# Patient Record
Sex: MALE | Race: WHITE | NOT HISPANIC OR LATINO | Employment: FULL TIME | URBAN - NONMETROPOLITAN AREA
[De-identification: names, ages, dates, MRNs, and addresses within clinical notes are randomized per-mention and may not be internally consistent; named-entity substitution may affect disease eponyms.]

---

## 2019-06-19 ENCOUNTER — OFFICE VISIT (OUTPATIENT)
Dept: CARDIOLOGY | Facility: CLINIC | Age: 59
End: 2019-06-19

## 2019-06-19 VITALS
SYSTOLIC BLOOD PRESSURE: 120 MMHG | BODY MASS INDEX: 27.05 KG/M2 | DIASTOLIC BLOOD PRESSURE: 82 MMHG | HEIGHT: 71 IN | OXYGEN SATURATION: 98 % | WEIGHT: 193.2 LBS | HEART RATE: 75 BPM

## 2019-06-19 DIAGNOSIS — E66.3 OVERWEIGHT (BMI 25.0-29.9): ICD-10-CM

## 2019-06-19 DIAGNOSIS — R94.31 ABNORMAL EKG: Primary | ICD-10-CM

## 2019-06-19 DIAGNOSIS — R06.09 DYSPNEA ON EXERTION: ICD-10-CM

## 2019-06-19 DIAGNOSIS — I44.0 FIRST DEGREE AV BLOCK: Primary | ICD-10-CM

## 2019-06-19 DIAGNOSIS — F17.200 SMOKER: ICD-10-CM

## 2019-06-19 PROBLEM — E11.9 TYPE 2 DIABETES MELLITUS WITHOUT COMPLICATION, WITHOUT LONG-TERM CURRENT USE OF INSULIN (HCC): Status: ACTIVE | Noted: 2019-06-19

## 2019-06-19 PROBLEM — I10 ESSENTIAL HYPERTENSION: Status: ACTIVE | Noted: 2019-06-19

## 2019-06-19 PROBLEM — E78.2 MIXED HYPERLIPIDEMIA: Status: ACTIVE | Noted: 2019-06-19

## 2019-06-19 PROCEDURE — 99204 OFFICE O/P NEW MOD 45 MIN: CPT | Performed by: INTERNAL MEDICINE

## 2019-06-19 PROCEDURE — 99406 BEHAV CHNG SMOKING 3-10 MIN: CPT | Performed by: INTERNAL MEDICINE

## 2019-06-19 PROCEDURE — 93000 ELECTROCARDIOGRAM COMPLETE: CPT | Performed by: INTERNAL MEDICINE

## 2019-06-19 RX ORDER — ATORVASTATIN CALCIUM 20 MG/1
TABLET, FILM COATED ORAL DAILY
COMMUNITY

## 2019-06-19 RX ORDER — LISINOPRIL 5 MG/1
TABLET ORAL
COMMUNITY

## 2019-06-19 NOTE — PATIENT INSTRUCTIONS
Steps to Quit Smoking  Smoking tobacco can be harmful to your health and can affect almost every organ in your body. Smoking puts you, and those around you, at risk for developing many serious chronic diseases. Quitting smoking is difficult, but it is one of the best things that you can do for your health. It is never too late to quit.  What are the benefits of quitting smoking?  When you quit smoking, you lower your risk of developing serious diseases and conditions, such as:  · Lung cancer or lung disease, such as COPD.  · Heart disease.  · Stroke.  · Heart attack.  · Infertility.  · Osteoporosis and bone fractures.    Additionally, symptoms such as coughing, wheezing, and shortness of breath may get better when you quit. You may also find that you get sick less often because your body is stronger at fighting off colds and infections. If you are pregnant, quitting smoking can help to reduce your chances of having a baby of low birth weight.  How do I get ready to quit?  When you decide to quit smoking, create a plan to make sure that you are successful. Before you quit:  · Pick a date to quit. Set a date within the next two weeks to give you time to prepare.  · Write down the reasons why you are quitting. Keep this list in places where you will see it often, such as on your bathroom mirror or in your car or wallet.  · Identify the people, places, things, and activities that make you want to smoke (triggers) and avoid them. Make sure to take these actions:  ? Throw away all cigarettes at home, at work, and in your car.  ? Throw away smoking accessories, such as ashtrays and lighters.  ? Clean your car and make sure to empty the ashtray.  ? Clean your home, including curtains and carpets.  · Tell your family, friends, and coworkers that you are quitting. Support from your loved ones can make quitting easier.  · Talk with your health care provider about your options for quitting smoking.  · Find out what treatment  options are covered by your health insurance.    What strategies can I use to quit smoking?  Talk with your healthcare provider about different strategies to quit smoking. Some strategies include:  · Quitting smoking altogether instead of gradually lessening how much you smoke over a period of time. Research shows that quitting “cold turkey” is more successful than gradually quitting.  · Attending in-person counseling to help you build problem-solving skills. You are more likely to have success in quitting if you attend several counseling sessions. Even short sessions of 10 minutes can be effective.  · Finding resources and support systems that can help you to quit smoking and remain smoke-free after you quit. These resources are most helpful when you use them often. They can include:  ? Online chats with a counselor.  ? Telephone quitlines.  ? Printed self-help materials.  ? Support groups or group counseling.  ? Text messaging programs.  ? Mobile phone applications.  · Taking medicines to help you quit smoking. (If you are pregnant or breastfeeding, talk with your health care provider first.) Some medicines contain nicotine and some do not. Both types of medicines help with cravings, but the medicines that include nicotine help to relieve withdrawal symptoms. Your health care provider may recommend:  ? Nicotine patches, gum, or lozenges.  ? Nicotine inhalers or sprays.  ? Non-nicotine medicine that is taken by mouth.    Talk with your health care provider about combining strategies, such as taking medicines while you are also receiving in-person counseling. Using these two strategies together makes you more likely to succeed in quitting than if you used either strategy on its own.  If you are pregnant or breastfeeding, talk with your health care provider about finding counseling or other support strategies to quit smoking. Do not take medicine to help you quit smoking unless told to do so by your health care  provider.  What things can I do to make it easier to quit?  Quitting smoking might feel overwhelming at first, but there is a lot that you can do to make it easier. Take these important actions:  · Reach out to your family and friends and ask that they support and encourage you during this time. Call telephone quitlines, reach out to support groups, or work with a counselor for support.  · Ask people who smoke to avoid smoking around you.  · Avoid places that trigger you to smoke, such as bars, parties, or smoke-break areas at work.  · Spend time around people who do not smoke.  · Lessen stress in your life, because stress can be a smoking trigger for some people. To lessen stress, try:  ? Exercising regularly.  ? Deep-breathing exercises.  ? Yoga.  ? Meditating.  ? Performing a body scan. This involves closing your eyes, scanning your body from head to toe, and noticing which parts of your body are particularly tense. Purposefully relax the muscles in those areas.  · Download or purchase mobile phone or tablet apps (applications) that can help you stick to your quit plan by providing reminders, tips, and encouragement. There are many free apps, such as QuitGuide from the CDC (Centers for Disease Control and Prevention). You can find other support for quitting smoking (smoking cessation) through smokefree.gov and other websites.    How will I feel when I quit smoking?  Within the first 24 hours of quitting smoking, you may start to feel some withdrawal symptoms. These symptoms are usually most noticeable 2-3 days after quitting, but they usually do not last beyond 2-3 weeks. Changes or symptoms that you might experience include:  · Mood swings.  · Restlessness, anxiety, or irritation.  · Difficulty concentrating.  · Dizziness.  · Strong cravings for sugary foods in addition to nicotine.  · Mild weight gain.  · Constipation.  · Nausea.  · Coughing or a sore throat.  · Changes in how your medicines work in your  "body.  · A depressed mood.  · Difficulty sleeping (insomnia).    After the first 2-3 weeks of quitting, you may start to notice more positive results, such as:  · Improved sense of smell and taste.  · Decreased coughing and sore throat.  · Slower heart rate.  · Lower blood pressure.  · Clearer skin.  · The ability to breathe more easily.  · Fewer sick days.    Quitting smoking is very challenging for most people. Do not get discouraged if you are not successful the first time. Some people need to make many attempts to quit before they achieve long-term success. Do your best to stick to your quit plan, and talk with your health care provider if you have any questions or concerns.  This information is not intended to replace advice given to you by your health care provider. Make sure you discuss any questions you have with your health care provider.  Document Released: 12/12/2002 Document Revised: 07/24/2018 Document Reviewed: 05/03/2016  [employer] Interactive Patient Education © 2019 [employer]  Exercise Stress Test  An exercise stress test is a test that is done to collect information about how your heart functions during exercise. The test is done while you are walking on a treadmill or using an exercise bike. The goal is to raise your heart rate and \"stress\" the heart. The heart is evaluated before, during, and after you exercise. An electrocardiogram (ECG) will be used to monitor the heart, and your blood pressure will also be monitored. In some cases, nuclear scanning or an ultrasound of the heart (echocardiogram) will also be done to evaluate your heart.  An exercise stress test is done to look for coronary artery disease (CAD). The test may also be done to:  · Evaluate your limits of exercise during cardiac rehabilitation.  · Check for high blood pressure during exercise.  · Check how well you can exercise after such treatments as coronary stenting or new medicines.  · Check for problems with blood flow to " your arms and legs during exercise.    If you have an abnormal test result, this may mean that you are not getting enough blood flow to your heart during exercise. More testing may be needed to understand why your test was not normal.  Tell a health care provider about:  · Any allergies you have.  · All medicines you are taking, including vitamins, herbs, eye drops, creams, and over-the-counter medicines.  · Any blood disorders you have.  · Any surgeries you have had.  · Any medical conditions you have.  · Whether you are pregnant or may be pregnant.  What are the risks?  Generally, this is a safe procedure. However, problems may occur, including:  · Pain or pressure in the following areas:  ? Chest.  ? Jaw or neck.  ? Between your shoulder blades.  ? Down your left arm.  · Dizziness or lightheadedness.  · Shortness of breath.  · Increased or irregular heartbeats.  · Nausea or vomiting.  · Heart attack (rare).  · Life-threatening abnormal heart rhythm (rare).    What happens before the procedure?  · Follow instructions from your health care provider about eating or drinking restrictions.  ? You may be told to avoid all forms of caffeine for 24 hours before the test. This includes coffee, tea (even decaffeinated tea), caffeinated sodas, chocolate, cocoa, and certain pain medicines.  · Ask your health care provider about:  ? Taking over-the-counter medicines, vitamins, herbs, and supplements.  ? Changing or stopping your regular medicines. This is especially important if you are taking diabetes medicines or beta-blocker medicines.  § If you have diabetes, ask how you are to take your insulin or pills. It is common to adjust your insulin dose the morning of the test.  § If you are taking beta-blocker medicines, it is important to talk to your health care provider about these medicines well before the date of your test. Taking beta-blocker medicines may interfere with the test. In some cases, these medicines may need to  be changed or stopped 24 hours or more before the test.  § If you wear a nitroglycerin patch, it may need to be removed prior to the test. Ask your health care provider if the patch should be removed before the test.  · If you use an inhaler for any breathing condition, bring it with you to the test.  · Do not apply lotions, powders, creams, or oils on your chest prior to the test.  · Wear loose-fitting clothes and comfortable walking shoes.  · Do not use any products that contain nicotine or tobacco, such as cigarettes and e-cigarettes, for 4 hours before the test or as told by your health care provider. If you need help quitting, ask your health care provider.  What happens during the procedure?  · Multiple electrodes will be attached to your chest.  · Multiple wires will be attached to the electrodes. These will transfer the electrical impulses from your heart to the ECG machine. Your heart will be monitored both at rest and while exercising.  · If you are also having an echocardiogram or nuclear scanning, images of your heart will be taken before and after you exercise.  · A blood pressure cuff will be placed around your arm to measure your blood pressure throughout the test. You will feel it tighten and loosen throughout the test.  · You will walk on a treadmill or use a stationary bike. If you cannot use these, you may be asked to turn a crank with your hands.  · You will start at a slow pace or level on the exercise machine. The exercise difficulty will be slowly increased to raise your heart rate. In the case of a treadmill, the speed and incline will gradually be increased.  · You may be asked to periodically breathe into a tube. This measures the gases you breathe out.  · You will be asked how you are feeling throughout the test. You will be asked to rate your level of exertion.  · Tell the staff right away if you feel:  ? Chest pain.  ? Dizziness.  ? Shortness of breath.  ? Too fatigued to  continue.  ? Pain or aching in your legs or arms.  · You will exercise until you have symptoms or until you reach a target heart rate. The test will also be stopped if you have changes in your blood pressure or ECG readings, or if you develop an irregular heartbeat (arrhythmia).  The procedure may vary among health care providers and hospitals.  What happens after the procedure?  · You will sit down and recover from the exercise. Your blood pressure, heart rate, and ECG will be monitored until you recover.  · You may return to your normal schedule, including diet, activities, and medicines, unless your health care provider tells you otherwise.  · It is up to you to get your test results. Ask your health care provider, or the department that is doing the test, when your results will be ready.  Summary  · An exercise stress test is a test that is done to collect information about how your heart functions during exercise.  · This test is done to look for coronary artery disease (CAD).  · During this test, you will walk on a treadmill or use an exercise bike to raise your heart rate.  · It is important to follow instructions from your health care provider about eating and drinking restrictions before the test. This may include avoiding caffeine and certain medicines before the test.  This information is not intended to replace advice given to you by your health care provider. Make sure you discuss any questions you have with your health care provider.  Document Released: 12/15/2001 Document Revised: 02/21/2018 Document Reviewed: 02/21/2018  [employer] Interactive Patient Education © 2019 [employer]    Dr. Morris has recommended a Six-Minute Walk Test    What Is the Six-Minute Walk Test?    The American Thoracic Society describes the six-minute walk test as a measure of functional status or fitness. It is used as a simple measure of aerobic exercise capacity. The results of this test may or may not lead your doctor  to do more sophisticated measures of your heart and lung function. During this test, you walk at your normal pace for six minutes. This test can be used to monitor your response to treatments for heart, lung and other health problems. This test is commonly used for people with pulmonary hypertension, interstitial lung disease, pre-lung transplant evaluation or COPD.      What to Expect When Doing the Test      Preparing for your test:  · Wear clothes and shoes that are comfortable.  · You may use your usual walking aids such as a cane or walker, if needed.  · It is okay to eat a light meal prior to your test.  · Take your usual medications.  · Do not exercise within two hours of testing.      During the test:  · The lin will measure your blood pressure, pulse and oxygen level usually with a pulse oximeter before you start to walk.  · You should be given the following instructions: The object of the test is to walk as far as possible for six minutes. You will walk at your normal pace to a chair or cone, and turn around. And you continue to walk back and forth for six minutes.  · Let the staff know if you are having chest pain or breathing difficulty.  · It is acceptable to slow down, rest or stop. After every minute interval, you will be given an update.      Safety:  · The lin will watch to see if you have breathing difficulty or chest pain.  · Oxygen and other supplies will be nearby if you need them.      Understanding the Results  The results of your test are then compared to what is known to be normal for people in your weight, height, gender and age categories. They can be used to estimate response to treatment, especially if repeated after a time interval, for instance six months or a year later. After your test, your provider may change your medication or exercise program based on your results.      What Are the Risks?  This is a low-risk medical evaluation. Medical help is easily available while the  test is being done.          This content was developed in partnership with the [employer], the philanthropic arm of the [employer].  Approved by Scientific and Medical Editorial Review Panel. Last reviewed May 31, 2017.

## 2019-06-19 NOTE — PROGRESS NOTES
Cardiovascular Medicine      Jaylon Morris M.D., Ph.D., [facility]         Yary Graham, APRN  [address] [place] [address]    Thank you for asking me to see David Najera for Abnormal EKG.    History of Present Illness  This is a 58 y.o. male with:    1.  Abnormal EKG: First-degree AV block with DE interval-208 ms  2.  Exertional dyspnea  3.  Risk factors: Type 2 diabetes, hypertension, dyslipidemia, smoker    David Najera is a 58 y.o. male who presents for consultation today.  Patient is actually referred for an abnormal EKG.  He was seen by an APRN who he typically sees for primary care.  He was noted to have some exertional dyspnea.  He has numerous risk factors for the development of coronary artery disease.  An EKG was obtained and was abnormal.  It showed a DE interval of 208 ms.  This is the first time he has been told that he had an abnormal EKG.  He denies a history of MI, CHF or coronary artery disease.  He is never had an ischemia evaluation.  He had no resting, exertional or nocturnal angina.  He does have numerous risk factors for the development of coronary artery disease.  Unfortunately, he does smoke.  He has smoked for quite some time and remains pre-contemplative. He smokes about 1 PPD.  He does have uncontrolled type 2 diabetes.  He is also been diagnosed with dyslipidemia and hypertension.  All of his comorbidities are managed by his primary care provider.  He has had no exertional dizziness, lightheadedness or syncope.  He does endorse exercise tolerance and significant dyspnea on exertion.  Interestingly, he has had no pulmonary function tests.  He has had no lower extremity edema, PND or orthopnea.  He has not had a TTE to examine his left ventricular ejection fraction or right ventricular ejection fraction.  PA pressures are unknown.     Review of Systems - Review of Systems   Cardiovascular: Positive for dyspnea on exertion. Negative for chest pain, claudication, cyanosis,  "irregular heartbeat, leg swelling, near-syncope, orthopnea, palpitations, paroxysmal nocturnal dyspnea and syncope.   Respiratory: Positive for cough, shortness of breath and wheezing. Negative for hemoptysis.         All other systems were reviewed and were negative.    family history includes Diabetes in his mother and sister.         No Known Allergies      Current Outpatient Medications:   •  metFORMIN (GLUCOPHAGE) 500 MG tablet, 500 mg Every 12 (Twelve) Hours. 2 tablets two times daily, Disp: , Rfl:   •  ASPIRIN 81 PO, Daily., Disp: , Rfl:   •  atorvastatin (LIPITOR) 20 MG tablet, Daily., Disp: , Rfl:   •  Insulin Glargine (LANTUS SOLOSTAR) 100 UNIT/ML injection pen, Lantus Solostar U-100 Insulin 100 unit/mL (3 mL) subcutaneous pen  Inject 10 units every day by subcutaneous route., Disp: , Rfl:   •  Liraglutide (VICTOZA) 18 MG/3ML solution pen-injector injection, Victoza 2-Justin 0.6 mg/0.1 mL (18 mg/3 mL) subcutaneous pen injector  Inject 0.6 mg daily for 1 week subcutaneously, then increase to 1.2 mg injected subcutaneously daily, Disp: , Rfl:   •  lisinopril (PRINIVIL,ZESTRIL) 5 MG tablet, lisinopril 5 mg tablet  Take 1 tablet(s) every day by oral route in the morning., Disp: , Rfl:         Physical Exam:  Vitals:    06/19/19 0931 06/19/19 0932   BP: 122/84 120/82   BP Location: Left arm Right arm   Patient Position: Sitting Sitting   Cuff Size: Adult Adult   Pulse: 75    SpO2: 98%    Weight: 87.6 kg (193 lb 3.2 oz)    Height: 180.3 cm (71\")    PainSc: 0-No pain    Body mass index is 26.95 kg/m².    Pulse Ox: Normal  on room air  General: alert, appears stated age and cooperative     Body Habitus: overweight    HEENT: Head: Normocephalic, no lesions, without obvious abnormality. No arcus senilis, xanthelasma or xanthomas.    Neuro: alert, oriented x3  speech normal in context and clarity  memory intact grossly  Pulses: 2+ and symmetric  JVP: Volume/Pulsation: Normal.  Normal waveforms.   Appropriate inspiratory " decrease.  No Kussmaul's. No Jaylen's.   Carotid Exam: no bruit normal pulsation bilaterally   Carotid Volume: normal.     Subclavian Bruit: absent  Vertebral Bruit: absent  Respirations: no increased work of breathing   Chest:  Normal    Pulmonary:Normal     Precordium: Normal impulses. P2 is not palpable.  RV Heave: absent  LV Heave: absent  [place]:  normal size and placement  Palpable S4: absent.  Heart rate: normal    Heart Rhythm: regular     Heart Sounds: S1: normal intensity  S2: normal intensity  S3: absent   S4: absent  Opening Snap: absent    A2-OS:  no  Pericardial Rub:  Absent   Ejection click: None      Murmurs:  absent   Extremity: moves all extremities equally.     DATA REVIEWED:     EKG. I personally reviewed and interpreted the EKG.    EKG today: Normal                              Assessment/Plan     1. First degree AV block.  As best I can tell, the patient is actually asymptomatic from this. His EKG is actually normal today.  I did go over with him the pathophysiology and epidemiology of this.  Regular evaluation is important because patients have been shown to have an increased risk of developing atrial fibrillation and higher-degree AV block.  It would be reasonable for his PCP to perform a yearly EKG.  Otherwise, reassurance was provided.    2. Dyspnea on exertion, likely multifactorial.  He does have a significant smoking history and I am concerned about the development of COPD.  He also has numerous risk factors for the development of obstructive coronary artery disease including uncontrolled type 2 diabetes.  Dyspnea might represent an anginal equivalent in this gentleman.  EKG is abnormal with a first-degree AV block, but is otherwise interpretable.    I have also recommended a 2D TTE to exclude structural heart disease and to evaluate his PA pressures.  He also will need a chest x-ray and PFT/pulmonary referral.  -TST  - Adult Transthoracic Echo Complete W/ Cont if Necessary Per  "Protocol  -PFTs, 6-minute walk test    3. Cardiac Risk Assessment/Dyslipidemia/BP Goals/Glucose Status/Diet/Current Weight/Tobacco status/Screening:   -Continue follow-up visits with PCP for monitoring of labs; Dietary changes: Increase soluble fiber: A printed copy of a low fat, low cholesterol diet was given; Reduce saturated fat, \"trans\" monounsaturated fatty acids, and cholesterol; Exercise changes:  Encouraged daily aerobic activity.    Blood Pressure:  -HTN is a significant risk factor for stroke, heart disease and vascular disease. I've recommended the patient continue current medications, if any, as prescribed by the primary care provider. I recommended they have close follow-up for ongoing mgmt of this and the medical comorbidities associated with HTN with their PCP.  -HTN hand-out    General patient education regarding weight:   The patient's BMI is Body mass index is 26.95 kg/m²..  This places the patient in weight class:  Overweight: 25.0-29.9kg/m2 .   -Weight loss hand-out  -Exercise intervention:   Hand out, increase aerobic activity  -Close PCP follow-up for Body mass index is 26.95 kg/m²..       Current Tobacco User:   -I advised David of the risks of continuing to use tobacco, and I provided him with tobacco cessation educational materials in the After Visit Summary.     During this visit, I spent 3 minutes counseling the patient regarding tobacco cessation.    -1-800-QUIT-NOW information was provided.     Screening:  -AAA screening recommended at 65.    Return for 4-6 weeks after testing for results.      "

## 2019-09-17 ENCOUNTER — PROCEDURE VISIT (OUTPATIENT)
Dept: CARDIOLOGY | Facility: CLINIC | Age: 59
End: 2019-09-17

## 2019-09-17 ENCOUNTER — OFFICE VISIT (OUTPATIENT)
Dept: PULMONOLOGY | Facility: CLINIC | Age: 59
End: 2019-09-17

## 2019-09-17 DIAGNOSIS — R06.09 DYSPNEA ON EXERTION: Primary | ICD-10-CM

## 2019-09-17 DIAGNOSIS — R06.09 DYSPNEA ON EXERTION: ICD-10-CM

## 2019-09-17 LAB
BH CV ECHO MEAS - ACS: 1.8 CM
BH CV ECHO MEAS - AO ISTHMUS: 2.2 CM
BH CV ECHO MEAS - AO MAX PG (FULL): 0.83 MMHG
BH CV ECHO MEAS - AO MAX PG: 4.1 MMHG
BH CV ECHO MEAS - AO MEAN PG (FULL): 0 MMHG
BH CV ECHO MEAS - AO MEAN PG: 2 MMHG
BH CV ECHO MEAS - AO ROOT AREA (BSA CORRECTED): 1.5
BH CV ECHO MEAS - AO ROOT AREA: 8 CM^2
BH CV ECHO MEAS - AO ROOT DIAM: 3.2 CM
BH CV ECHO MEAS - AO V2 MAX: 101 CM/SEC
BH CV ECHO MEAS - AO V2 MEAN: 62 CM/SEC
BH CV ECHO MEAS - AO V2 VTI: 15.9 CM
BH CV ECHO MEAS - ASC AORTA: 2.8 CM
BH CV ECHO MEAS - AVA(I,A): 3.6 CM^2
BH CV ECHO MEAS - AVA(I,D): 3.6 CM^2
BH CV ECHO MEAS - AVA(V,A): 3.4 CM^2
BH CV ECHO MEAS - AVA(V,D): 3.4 CM^2
BH CV ECHO MEAS - BSA(HAYCOCK): 2.1 M^2
BH CV ECHO MEAS - BSA: 2.1 M^2
BH CV ECHO MEAS - BZI_BMI: 26.9 KILOGRAMS/M^2
BH CV ECHO MEAS - BZI_METRIC_HEIGHT: 180.3 CM
BH CV ECHO MEAS - BZI_METRIC_WEIGHT: 87.5 KG
BH CV ECHO MEAS - EDV(CUBED): 91.1 ML
BH CV ECHO MEAS - EDV(MOD-SP2): 75 ML
BH CV ECHO MEAS - EDV(MOD-SP4): 85 ML
BH CV ECHO MEAS - EDV(TEICH): 92.4 ML
BH CV ECHO MEAS - EF(CUBED): 82.9 %
BH CV ECHO MEAS - EF(MOD-BP): 74 %
BH CV ECHO MEAS - EF(MOD-SP2): 70.7 %
BH CV ECHO MEAS - EF(MOD-SP4): 77.6 %
BH CV ECHO MEAS - EF(TEICH): 75.9 %
BH CV ECHO MEAS - ESV(CUBED): 15.6 ML
BH CV ECHO MEAS - ESV(MOD-SP2): 22 ML
BH CV ECHO MEAS - ESV(MOD-SP4): 19 ML
BH CV ECHO MEAS - ESV(TEICH): 22.3 ML
BH CV ECHO MEAS - FS: 44.4 %
BH CV ECHO MEAS - IVS/LVPW: 1
BH CV ECHO MEAS - IVSD: 0.9 CM
BH CV ECHO MEAS - LA DIMENSION: 3 CM
BH CV ECHO MEAS - LA/AO: 0.94
BH CV ECHO MEAS - LV DIASTOLIC VOL/BSA (35-75): 40.9 ML/M^2
BH CV ECHO MEAS - LV MASS(C)D: 132.8 GRAMS
BH CV ECHO MEAS - LV MASS(C)DI: 63.9 GRAMS/M^2
BH CV ECHO MEAS - LV MAX PG: 3.3 MMHG
BH CV ECHO MEAS - LV MEAN PG: 2 MMHG
BH CV ECHO MEAS - LV SYSTOLIC VOL/BSA (12-30): 9.1 ML/M^2
BH CV ECHO MEAS - LV V1 MAX: 90.2 CM/SEC
BH CV ECHO MEAS - LV V1 MEAN: 56.8 CM/SEC
BH CV ECHO MEAS - LV V1 VTI: 15.2 CM
BH CV ECHO MEAS - LVIDD: 4.5 CM
BH CV ECHO MEAS - LVIDS: 2.5 CM
BH CV ECHO MEAS - LVLD AP2: 7.5 CM
BH CV ECHO MEAS - LVLD AP4: 7.4 CM
BH CV ECHO MEAS - LVLS AP2: 5.4 CM
BH CV ECHO MEAS - LVLS AP4: 5.6 CM
BH CV ECHO MEAS - LVOT AREA (M): 3.8 CM^2
BH CV ECHO MEAS - LVOT AREA: 3.8 CM^2
BH CV ECHO MEAS - LVOT DIAM: 2.2 CM
BH CV ECHO MEAS - LVPWD: 0.9 CM
BH CV ECHO MEAS - MV A MAX VEL: 93.2 CM/SEC
BH CV ECHO MEAS - MV DEC SLOPE: 498 CM/SEC^2
BH CV ECHO MEAS - MV E MAX VEL: 63.6 CM/SEC
BH CV ECHO MEAS - MV E/A: 0.68
BH CV ECHO MEAS - MV MAX PG: 2.5 MMHG
BH CV ECHO MEAS - MV MEAN PG: 1 MMHG
BH CV ECHO MEAS - MV P1/2T MAX VEL: 73.7 CM/SEC
BH CV ECHO MEAS - MV P1/2T: 43.3 MSEC
BH CV ECHO MEAS - MV V2 MAX: 79.8 CM/SEC
BH CV ECHO MEAS - MV V2 MEAN: 56.1 CM/SEC
BH CV ECHO MEAS - MV V2 VTI: 19.5 CM
BH CV ECHO MEAS - MVA P1/2T LCG: 3 CM^2
BH CV ECHO MEAS - MVA(P1/2T): 5.1 CM^2
BH CV ECHO MEAS - MVA(VTI): 3 CM^2
BH CV ECHO MEAS - PA MAX PG: 7.8 MMHG
BH CV ECHO MEAS - PA V2 MAX: 140 CM/SEC
BH CV ECHO MEAS - RAP SYSTOLE: 8 MMHG
BH CV ECHO MEAS - RVDD: 2.8 CM
BH CV ECHO MEAS - RVSP: 32 MMHG
BH CV ECHO MEAS - SI(AO): 61.6 ML/M^2
BH CV ECHO MEAS - SI(CUBED): 36.3 ML/M^2
BH CV ECHO MEAS - SI(LVOT): 27.8 ML/M^2
BH CV ECHO MEAS - SI(MOD-SP2): 25.5 ML/M^2
BH CV ECHO MEAS - SI(MOD-SP4): 31.8 ML/M^2
BH CV ECHO MEAS - SI(TEICH): 33.8 ML/M^2
BH CV ECHO MEAS - SV(AO): 127.9 ML
BH CV ECHO MEAS - SV(CUBED): 75.5 ML
BH CV ECHO MEAS - SV(LVOT): 57.8 ML
BH CV ECHO MEAS - SV(MOD-SP2): 53 ML
BH CV ECHO MEAS - SV(MOD-SP4): 66 ML
BH CV ECHO MEAS - SV(TEICH): 70.1 ML
BH CV ECHO MEAS - TR MAX VEL: 239 CM/SEC

## 2019-09-17 PROCEDURE — 94729 DIFFUSING CAPACITY: CPT | Performed by: INTERNAL MEDICINE

## 2019-09-17 PROCEDURE — 94060 EVALUATION OF WHEEZING: CPT | Performed by: INTERNAL MEDICINE

## 2019-09-17 PROCEDURE — 94727 GAS DIL/WSHOT DETER LNG VOL: CPT | Performed by: INTERNAL MEDICINE

## 2019-09-17 PROCEDURE — 94618 PULMONARY STRESS TESTING: CPT | Performed by: INTERNAL MEDICINE

## 2019-09-17 NOTE — PROGRESS NOTES
David Najera  Procedure: 6 Minute Walk Test   Indication:avila    Pretest: /64               HR:79               Sa02:99               Dyspnea:0               Fatigue:0    Post Test: BP:118/74               HR:93               Sa02:98               Dyspnea:3               Fatigue:0    First 6MWT:yes    Supplemental oxygen during test:no    Stopped before 6 minutes:no    Pauses:none    Results in distance walked:376.36 m    Did individual experience any pain or discomfort:no    Attestation:  I was immediately available for the above test and agree with the data.     NYHA Functional Class II.    Jaylon Morris MD PhD      -----------------------------------------------------------------------------------------------------------------  [facility] performs 6MWT to the ATS guidelines for 6MWT (2002). Predicted distance is from:      -------------------------------------------------------------------------------------------------------------

## 2019-09-18 ENCOUNTER — DOCUMENTATION (OUTPATIENT)
Dept: CARDIOLOGY | Facility: CLINIC | Age: 59
End: 2019-09-18

## 2019-09-18 DIAGNOSIS — R94.39 ABNORMAL STRESS ECG WITH TREADMILL: Primary | ICD-10-CM

## 2019-09-18 DIAGNOSIS — R06.09 DYSPNEA ON EXERTION: ICD-10-CM

## 2019-09-18 LAB
BH CV STRESS BP STAGE 1: NORMAL
BH CV STRESS BP STAGE 2: NORMAL
BH CV STRESS DURATION MIN STAGE 1: 3
BH CV STRESS DURATION MIN STAGE 2: 2
BH CV STRESS DURATION SEC STAGE 1: 0
BH CV STRESS DURATION SEC STAGE 2: 24
BH CV STRESS GRADE STAGE 1: 10
BH CV STRESS GRADE STAGE 2: 12
BH CV STRESS HR STAGE 1: 122
BH CV STRESS HR STAGE 2: 136
BH CV STRESS METS STAGE 1: 5
BH CV STRESS METS STAGE 2: 7.5
BH CV STRESS PROTOCOL 1: NORMAL
BH CV STRESS RECOVERY BP: NORMAL MMHG
BH CV STRESS RECOVERY HR: 97 BPM
BH CV STRESS SPEED STAGE 1: 1.7
BH CV STRESS SPEED STAGE 2: 2.5
BH CV STRESS STAGE 1: 1
BH CV STRESS STAGE 2: 2
MAXIMAL PREDICTED HEART RATE: 160 BPM
PERCENT MAX PREDICTED HR: 85.63 %
STRESS BASELINE BP: NORMAL MMHG
STRESS BASELINE HR: 93 BPM
STRESS PERCENT HR: 101 %
STRESS POST ESTIMATED WORKLOAD: 7 METS
STRESS POST EXERCISE DUR MIN: 5 MIN
STRESS POST EXERCISE DUR SEC: 24 SEC
STRESS POST PEAK BP: NORMAL MMHG
STRESS POST PEAK HR: 137 BPM
STRESS TARGET HR: 136 BPM

## 2019-09-25 ENCOUNTER — TELEPHONE (OUTPATIENT)
Dept: CARDIOLOGY | Facility: CLINIC | Age: 59
End: 2019-09-25

## 2019-09-25 NOTE — TELEPHONE ENCOUNTER
Patient was instructed to report to [facility] for stress echo on October 15 at 2:45. This was rescheduled from 1 pm per dr. Morris. Mr. Najera verbalized understanding.

## 2019-10-15 ENCOUNTER — HOSPITAL ENCOUNTER (OUTPATIENT)
Dept: CARDIOLOGY | Facility: HOSPITAL | Age: 59
Discharge: HOME | End: 2019-10-15
Admitting: INTERNAL MEDICINE

## 2019-10-15 ENCOUNTER — APPOINTMENT (OUTPATIENT)
Dept: CARDIOLOGY | Facility: HOSPITAL | Age: 59
End: 2019-10-15

## 2019-10-15 VITALS — WEIGHT: 182 LBS | BODY MASS INDEX: 25.38 KG/M2

## 2019-10-15 LAB
BH CV ECHO MEAS - BSA(HAYCOCK): 2.1 M^2
BH CV ECHO MEAS - BSA: 2.1 M^2
BH CV ECHO MEAS - BZI_BMI: 26.9 KILOGRAMS/M^2
BH CV ECHO MEAS - BZI_METRIC_HEIGHT: 180.3 CM
BH CV ECHO MEAS - BZI_METRIC_WEIGHT: 87.5 KG
BH CV STRESS BP STAGE 1: NORMAL
BH CV STRESS BP STAGE 2: NORMAL
BH CV STRESS BP STAGE 3: NORMAL
BH CV STRESS DOSE DOBUTAMINE STAGE 1: 10
BH CV STRESS DOSE DOBUTAMINE STAGE 2: 20
BH CV STRESS DOSE DOBUTAMINE STAGE 3: 30
BH CV STRESS DURATION MIN STAGE 1: 3
BH CV STRESS DURATION MIN STAGE 2: 3
BH CV STRESS DURATION MIN STAGE 3: 2
BH CV STRESS DURATION SEC STAGE 1: 0
BH CV STRESS DURATION SEC STAGE 2: 0
BH CV STRESS DURATION SEC STAGE 3: 26
BH CV STRESS ECHO POST STRESS EJECTION FRACTION EF: 70 %
BH CV STRESS HR STAGE 1: 83
BH CV STRESS HR STAGE 2: 99
BH CV STRESS HR STAGE 3: 139
BH CV STRESS PROTOCOL 1: NORMAL
BH CV STRESS RECOVERY BP: NORMAL MMHG
BH CV STRESS RECOVERY HR: 101 BPM
BH CV STRESS STAGE 1: 1
BH CV STRESS STAGE 2: 2
BH CV STRESS STAGE 3: 3
MAXIMAL PREDICTED HEART RATE: 160 BPM
PERCENT MAX PREDICTED HR: 86.88 %
STRESS BASELINE BP: NORMAL MMHG
STRESS BASELINE HR: 84 BPM
STRESS PERCENT HR: 102 %
STRESS POST ESTIMATED WORKLOAD: 1 METS
STRESS POST EXERCISE DUR MIN: 8 MIN
STRESS POST EXERCISE DUR SEC: 25 SEC
STRESS POST PEAK BP: NORMAL MMHG
STRESS POST PEAK HR: 139 BPM
STRESS TARGET HR: 136 BPM

## 2019-10-15 PROCEDURE — 93017 CV STRESS TEST TRACING ONLY: CPT

## 2019-10-15 PROCEDURE — 93350 STRESS TTE ONLY: CPT

## 2019-10-15 PROCEDURE — 25010000002 DOBUTAMINE: Performed by: INTERNAL MEDICINE

## 2019-10-15 PROCEDURE — 93351 STRESS TTE COMPLETE: CPT | Performed by: INTERNAL MEDICINE

## 2019-10-15 PROCEDURE — 93320 DOPPLER ECHO COMPLETE: CPT

## 2019-10-15 PROCEDURE — 93325 DOPPLER ECHO COLOR FLOW MAPG: CPT

## 2019-10-15 PROCEDURE — 25010000002 ATROPINE PER 0.01 MG: Performed by: INTERNAL MEDICINE

## 2019-10-15 RX ORDER — ATROPINE SULFATE 1 MG/ML
0.6 INJECTION, SOLUTION INTRAMUSCULAR; INTRAVENOUS; SUBCUTANEOUS ONCE
Status: COMPLETED | OUTPATIENT
Start: 2019-10-15 | End: 2019-10-15

## 2019-10-15 RX ADMIN — DOBUTAMINE 10 MCG/KG/MIN: 12.5 INJECTION, SOLUTION, CONCENTRATE INTRAVENOUS at 14:24

## 2019-10-15 RX ADMIN — ATROPINE SULFATE 0.6 MG: 1 INJECTION, SOLUTION INTRAMUSCULAR; INTRAVENOUS; SUBCUTANEOUS at 14:24

## 2019-10-30 ENCOUNTER — OFFICE VISIT (OUTPATIENT)
Dept: CARDIOLOGY | Facility: CLINIC | Age: 59
End: 2019-10-30

## 2019-10-30 VITALS
BODY MASS INDEX: 26.01 KG/M2 | SYSTOLIC BLOOD PRESSURE: 100 MMHG | DIASTOLIC BLOOD PRESSURE: 68 MMHG | WEIGHT: 185.8 LBS | HEIGHT: 71 IN | HEART RATE: 85 BPM | OXYGEN SATURATION: 98 %

## 2019-10-30 DIAGNOSIS — F17.200 SMOKER: ICD-10-CM

## 2019-10-30 DIAGNOSIS — R06.09 DYSPNEA ON EXERTION: Primary | ICD-10-CM

## 2019-10-30 DIAGNOSIS — I44.0 FIRST DEGREE AV BLOCK: ICD-10-CM

## 2019-10-30 DIAGNOSIS — R94.2 ABNORMAL PFTS: ICD-10-CM

## 2019-10-30 DIAGNOSIS — E66.3 OVERWEIGHT (BMI 25.0-29.9): ICD-10-CM

## 2019-10-30 PROCEDURE — 99214 OFFICE O/P EST MOD 30 MIN: CPT | Performed by: INTERNAL MEDICINE

## 2019-10-30 NOTE — PATIENT INSTRUCTIONS
Chronic Obstructive Pulmonary Disease  Chronic obstructive pulmonary disease (COPD) is a long-term (chronic) lung problem. When you have COPD, it is hard for air to get in and out of your lungs. Usually the condition gets worse over time, and your lungs will never return to normal. There are things you can do to keep yourself as healthy as possible.  · Your doctor may treat your condition with:  ? Medicines.  ? Oxygen.  ? Lung surgery.  · Your doctor may also recommend:  ? Rehabilitation. This includes steps to make your body work better. It may involve a team of specialists.  ? Quitting smoking, if you smoke.  ? Exercise and changes to your diet.  ? Comfort measures (palliative care).  Follow these instructions at home:  Medicines  · Take over-the-counter and prescription medicines only as told by your doctor.  · Talk to your doctor before taking any cough or allergy medicines. You may need to avoid medicines that cause your lungs to be dry.  Lifestyle  · If you smoke, stop. Smoking makes the problem worse. If you need help quitting, ask your doctor.  · Avoid being around things that make your breathing worse. This may include smoke, chemicals, and fumes.  · Stay active, but remember to rest as well.  · Learn and use tips on how to relax.  · Make sure you get enough sleep. Most adults need at least 7 hours of sleep every night.  · Eat healthy foods. Eat smaller meals more often. Rest before meals.  Controlled breathing  Learn and use tips on how to control your breathing as told by your doctor. Try:  · Breathing in (inhaling) through your nose for 1 second. Then, pucker your lips and breath out (exhale) through your lips for 2 seconds.  · Putting one hand on your belly (abdomen). Breathe in slowly through your nose for 1 second. Your hand on your belly should move out. Pucker your lips and breathe out slowly through your lips. Your hand on your belly should move in as you breathe out.    Controlled coughing  Learn  and use controlled coughing to clear mucus from your lungs. Follow these steps:  1. Lean your head a little forward.  2. Breathe in deeply.  3. Try to hold your breath for 3 seconds.  4. Keep your mouth slightly open while coughing 2 times.  5. Spit any mucus out into a tissue.  6. Rest and do the steps again 1 or 2 times as needed.  General instructions  · Make sure you get all the shots (vaccines) that your doctor recommends. Ask your doctor about a flu shot and a pneumonia shot.  · Use oxygen therapy and pulmonary rehabilitation if told by your doctor. If you need home oxygen therapy, ask your doctor if you should buy a tool to measure your oxygen level (oximeter).  · Make a COPD action plan with your doctor. This helps you to know what to do if you feel worse than usual.  · Manage any other conditions you have as told by your doctor.  · Avoid going outside when it is very hot, cold, or humid.  · Avoid people who have a sickness you can catch (contagious).  · Keep all follow-up visits as told by your doctor. This is important.  Contact a doctor if:  · You cough up more mucus than usual.  · There is a change in the color or thickness of the mucus.  · It is harder to breathe than usual.  · Your breathing is faster than usual.  · You have trouble sleeping.  · You need to use your medicines more often than usual.  · You have trouble doing your normal activities such as getting dressed or walking around the house.  Get help right away if:  · You have shortness of breath while resting.  · You have shortness of breath that stops you from:  ? Being able to talk.  ? Doing normal activities.  · Your chest hurts for longer than 5 minutes.  · Your skin color is more blue than usual.  · Your pulse oximeter shows that you have low oxygen for longer than 5 minutes.  · You have a fever.  · You feel too tired to breathe normally.  Summary  · Chronic obstructive pulmonary disease (COPD) is a long-term lung problem.  · The way your  lungs work will never return to normal. Usually the condition gets worse over time. There are things you can do to keep yourself as healthy as possible.  · Take over-the-counter and prescription medicines only as told by your doctor.  · If you smoke, stop. Smoking makes the problem worse.  This information is not intended to replace advice given to you by your health care provider. Make sure you discuss any questions you have with your health care provider.  Document Released: 06/05/2009 Document Revised: 01/22/2018 Document Reviewed: 01/22/2018  [employer] Interactive Patient Education © 2019 [employer]    Steps to Quit Smoking    Smoking tobacco can be harmful to your health and can affect almost every organ in your body. Smoking puts you, and those around you, at risk for developing many serious chronic diseases. Quitting smoking is difficult, but it is one of the best things that you can do for your health. It is never too late to quit.  What are the benefits of quitting smoking?  When you quit smoking, you lower your risk of developing serious diseases and conditions, such as:  · Lung cancer or lung disease, such as COPD.  · Heart disease.  · Stroke.  · Heart attack.  · Infertility.  · Osteoporosis and bone fractures.  Additionally, symptoms such as coughing, wheezing, and shortness of breath may get better when you quit. You may also find that you get sick less often because your body is stronger at fighting off colds and infections. If you are pregnant, quitting smoking can help to reduce your chances of having a baby of low birth weight.  How do I get ready to quit?  When you decide to quit smoking, create a plan to make sure that you are successful. Before you quit:  · Pick a date to quit. Set a date within the next two weeks to give you time to prepare.  · Write down the reasons why you are quitting. Keep this list in places where you will see it often, such as on your bathroom mirror or in your car or  wallet.  · Identify the people, places, things, and activities that make you want to smoke (triggers) and avoid them. Make sure to take these actions:  ? Throw away all cigarettes at home, at work, and in your car.  ? Throw away smoking accessories, such as ashtrays and lighters.  ? Clean your car and make sure to empty the ashtray.  ? Clean your home, including curtains and carpets.  · Tell your family, friends, and coworkers that you are quitting. Support from your loved ones can make quitting easier.  · Talk with your health care provider about your options for quitting smoking.  · Find out what treatment options are covered by your health insurance.  What strategies can I use to quit smoking?  Talk with your healthcare provider about different strategies to quit smoking. Some strategies include:  · Quitting smoking altogether instead of gradually lessening how much you smoke over a period of time. Research shows that quitting “cold turkey” is more successful than gradually quitting.  · Attending in-person counseling to help you build problem-solving skills. You are more likely to have success in quitting if you attend several counseling sessions. Even short sessions of 10 minutes can be effective.  · Finding resources and support systems that can help you to quit smoking and remain smoke-free after you quit. These resources are most helpful when you use them often. They can include:  ? Online chats with a counselor.  ? Telephone quitlines.  ? Printed self-help materials.  ? Support groups or group counseling.  ? Text messaging programs.  ? Mobile phone applications.  · Taking medicines to help you quit smoking. (If you are pregnant or breastfeeding, talk with your health care provider first.) Some medicines contain nicotine and some do not. Both types of medicines help with cravings, but the medicines that include nicotine help to relieve withdrawal symptoms. Your health care provider may recommend:  ? Nicotine  patches, gum, or lozenges.  ? Nicotine inhalers or sprays.  ? Non-nicotine medicine that is taken by mouth.  Talk with your health care provider about combining strategies, such as taking medicines while you are also receiving in-person counseling. Using these two strategies together makes you more likely to succeed in quitting than if you used either strategy on its own.  If you are pregnant or breastfeeding, talk with your health care provider about finding counseling or other support strategies to quit smoking. Do not take medicine to help you quit smoking unless told to do so by your health care provider.  What things can I do to make it easier to quit?  Quitting smoking might feel overwhelming at first, but there is a lot that you can do to make it easier. Take these important actions:  · Reach out to your family and friends and ask that they support and encourage you during this time. Call telephone quitlines, reach out to support groups, or work with a counselor for support.  · Ask people who smoke to avoid smoking around you.  · Avoid places that trigger you to smoke, such as bars, parties, or smoke-break areas at work.  · Spend time around people who do not smoke.  · Lessen stress in your life, because stress can be a smoking trigger for some people. To lessen stress, try:  ? Exercising regularly.  ? Deep-breathing exercises.  ? Yoga.  ? Meditating.  ? Performing a body scan. This involves closing your eyes, scanning your body from head to toe, and noticing which parts of your body are particularly tense. Purposefully relax the muscles in those areas.  · Download or purchase mobile phone or tablet apps (applications) that can help you stick to your quit plan by providing reminders, tips, and encouragement. There are many free apps, such as QuitGuide from the CDC (Centers for Disease Control and Prevention). You can find other support for quitting smoking (smoking cessation) through smokefree.gov and other  websites.  How will I feel when I quit smoking?  Within the first 24 hours of quitting smoking, you may start to feel some withdrawal symptoms. These symptoms are usually most noticeable 2-3 days after quitting, but they usually do not last beyond 2-3 weeks. Changes or symptoms that you might experience include:  · Mood swings.  · Restlessness, anxiety, or irritation.  · Difficulty concentrating.  · Dizziness.  · Strong cravings for sugary foods in addition to nicotine.  · Mild weight gain.  · Constipation.  · Nausea.  · Coughing or a sore throat.  · Changes in how your medicines work in your body.  · A depressed mood.  · Difficulty sleeping (insomnia).  After the first 2-3 weeks of quitting, you may start to notice more positive results, such as:  · Improved sense of smell and taste.  · Decreased coughing and sore throat.  · Slower heart rate.  · Lower blood pressure.  · Clearer skin.  · The ability to breathe more easily.  · Fewer sick days.  Quitting smoking is very challenging for most people. Do not get discouraged if you are not successful the first time. Some people need to make many attempts to quit before they achieve long-term success. Do your best to stick to your quit plan, and talk with your health care provider if you have any questions or concerns.  This information is not intended to replace advice given to you by your health care provider. Make sure you discuss any questions you have with your health care provider.  Document Released: 12/12/2002 Document Revised: 07/24/2018 Document Reviewed: 05/03/2016  [employer] Interactive Patient Education © 2019 [employer]

## 2019-10-30 NOTE — PROGRESS NOTES
Cardiovascular Medicine      Jaylon Morris M.D., Ph.D., [facility]           History of Present Illness  This is a 59 y.o. male with:    1.  Abnormal EKG: First-degree AV block with NJ interval-208 ms  2.  Exertional dyspnea  3.  Risk factors: Type 2 diabetes, hypertension, dyslipidemia, smoker    David Najera is a 59 y.o. male who returns to clinic today.  The patient was initially referred for an abnormal EKG.  He had a first-degree AV block with a NJ interval of 208 ms.  He has no prior history of MI, CHF or CAD.  He is a smoker and was endorsing exertional dyspnea.  I thought this likely was a pulmonary etiology.  However, he was sent for a 2D TTE and a treadmill.  His 2D TTE was structurally normal.  His  TST was moderate risk so he was sent for a stress echo which was low risk.  He returns today for results.  He also has PFTs which were abnormal showing a mixed physiology with an obstructive and restrictive process. He continues with dyspnea and wheezes.     Review of Systems - Review of Systems   Cardiovascular: Positive for dyspnea on exertion. Negative for chest pain, claudication, cyanosis, irregular heartbeat, leg swelling, near-syncope, orthopnea, palpitations, paroxysmal nocturnal dyspnea and syncope.   Respiratory: Positive for cough, shortness of breath and wheezing. Negative for hemoptysis.         All other systems were reviewed and were negative.    family history includes Diabetes in his mother and sister.     reports that he has been smoking cigarettes.  He has a 20.00 pack-year smoking history. He has never used smokeless tobacco. He reports that he drinks about 3.6 oz of alcohol per week. He reports that he uses drugs. Drug: Marijuana.    No Known Allergies      Current Outpatient Medications:   •  ASPIRIN 81 PO, Daily., Disp: , Rfl:   •  atorvastatin (LIPITOR) 20 MG tablet, Daily., Disp: , Rfl:   •  Insulin Glargine (LANTUS SOLOSTAR) 100 UNIT/ML injection pen, Lantus Solostar  "U-100 Insulin 100 unit/mL (3 mL) subcutaneous pen  Inject 10 units every day by subcutaneous route., Disp: , Rfl:   •  Liraglutide (VICTOZA) 18 MG/3ML solution pen-injector injection, Victoza 2-Justin 0.6 mg/0.1 mL (18 mg/3 mL) subcutaneous pen injector  Inject 0.6 mg daily for 1 week subcutaneously, then increase to 1.2 mg injected subcutaneously daily, Disp: , Rfl:   •  lisinopril (PRINIVIL,ZESTRIL) 5 MG tablet, lisinopril 5 mg tablet  Take 1 tablet(s) every day by oral route in the morning., Disp: , Rfl:   •  metFORMIN (GLUCOPHAGE) 500 MG tablet, 500 mg Every 12 (Twelve) Hours. 2 tablets two times daily, Disp: , Rfl:         Physical Exam:  Vitals:    10/30/19 1334   BP: 100/68   BP Location: Left arm   Patient Position: Sitting   Cuff Size: Adult   Pulse: 85   SpO2: 98%   Weight: 84.3 kg (185 lb 12.8 oz)   Height: 180.3 cm (71\")   PainSc: 0-No pain   Body mass index is 25.91 kg/m².    Pulse Ox: Normal  on room air  General: alert, appears stated age and cooperative     Body Habitus: overweight    Pulses: 2+ and symmetric  JVP: Volume/Pulsation: Normal.  Normal waveforms.   Appropriate inspiratory decrease.  No Kussmaul's. No Jaylen's.     Precordium: Normal impulses. P2 is not palpable.  RV Heave: absent  LV Heave: absent  [place]:  normal size and placement  Palpable S4: absent.  Heart rate: normal    Heart Rhythm: regular     Heart Sounds: S1: normal intensity  S2: normal intensity  S3: absent   S4: absent  Opening Snap: absent    A2-OS:  no  Pericardial Rub:  Absent   Ejection click: None      Murmurs:  absent   Extremity: moves all extremities equally.     DATA REVIEWED:       Assessment/Plan     1. First degree AV block.  As best I can tell, the patient is actually asymptomatic from this. His EKG is actually normal today.  I did go over with him the pathophysiology and epidemiology of this.  Regular evaluation is important because patients have been shown to have an increased risk of developing atrial " "fibrillation and higher-degree AV block.  It would be reasonable for his PCP to perform a yearly EKG.  Otherwise, reassurance was provided.    2. Dyspnea on exertion, likely multifactorial.  His cardiovascular evaluation was unremarkable.  He has a structurally normal 2D TTE and a stress echo.  However, I did inform him today of his abnormal PFTs.  I suspect he has some damage from smoking.  Referral to pulmonary was offered.  -Pulm referral    3. Cardiac Risk Assessment/Dyslipidemia/BP Goals/Glucose Status/Diet/Current Weight/Tobacco status/Screening:   -Continue follow-up visits with PCP for monitoring of labs; Dietary changes: Increase soluble fiber: A printed copy of a low fat, low cholesterol diet was given; Reduce saturated fat, \"trans\" monounsaturated fatty acids, and cholesterol; Exercise changes:  Encouraged daily aerobic activity.    Blood Pressure:  -HTN is a significant risk factor for stroke, heart disease and vascular disease. I've recommended the patient continue current medications, if any, as prescribed by the primary care provider. I recommended they have close follow-up for ongoing mgmt of this and the medical comorbidities associated with HTN with their PCP.  -HTN hand-out    General patient education regarding weight:   The patient's BMI is Body mass index is 25.91 kg/m²..  This places the patient in weight class:  Overweight: 25.0-29.9kg/m2 .   -Weight loss hand-out  -Exercise intervention:   Hand out, increase aerobic activity  -Close PCP follow-up for Body mass index is 25.91 kg/m²..     Current Tobacco User:   -I advised David of the risks of continuing to use tobacco, and I provided him with tobacco cessation educational materials in the After Visit Summary.  During this visit, I spent 3 minutes counseling the patient regarding tobacco cessation. He has stopped smoking before for about two years.   -1-800-QUIT-NOW information was provided.         David Najera is discharged from " my care. No further follow-up in my office. The patient is to return to the care of their PCP.

## 2019-11-11 DIAGNOSIS — R06.09 DYSPNEA ON EXERTION: Primary | ICD-10-CM

## 2019-11-13 PROBLEM — F17.210 CIGARETTE NICOTINE DEPENDENCE, UNCOMPLICATED: Status: ACTIVE | Noted: 2019-11-13

## 2019-11-13 NOTE — PROGRESS NOTES
Pulmonary Consultation    Jaylon Morris,*,    Thank you for asking me to see David Najera for   Chief Complaint   Patient presents with   • Shortness of Breath   .    Subjective     History of Present Illness  David Najera is a 59 y.o. male with a PMH significant for tobacco use, hypertension, hyperlipidemia, and diabetes mellitus who presents for evaluation of abnormal PFTs.    Pt states he had a wellness check at work and was diagnosed with DM. He was then referred to a cardiologist by his new PCP. Pt had PFTs as he smokes and was told they were abnormal so he was referred to me. He does admit to dyspnea with stairs, wheeze in the AM, and some cough in on awakening. Pt admits to some throat clearing and some postnasal. He denies prior allergies but he does admit to some sneezing.  He denies chest pain, weight changes, leg swelling, or recent illnesses. He denies heartburn unless he eats something spicy. Pt does take lisinopril but he has been without cough until recenlty.  He reports he did have a lung cancer screening CT done at [facility] and was told that there was nothing wrong with his lungs.  He did stop smoking 2 weeks ago but he is having cravings. 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 There is no lung disease or lung cancer in the family.     Tobacco use history:  Type: cigarettes  Amount: >1 ppd  Duration: 35 years  Cessation: 11/2019   Willing to quit: N/A      Review of Systems: History obtained from chart review and the patient.  Review of Systems   Constitutional: Negative for fever and unexpected weight change.   HENT: Positive for congestion, postnasal drip, sneezing and voice change.    Respiratory: Positive for cough, shortness of breath and wheezing.    Cardiovascular: Negative for chest pain and leg swelling.   Gastrointestinal: Negative for abdominal pain.     As described in the HPI. Otherwise, remainder of ROS (14 systems) were negative.    Patient Active Problem List    Diagnosis   • First degree AV block   • Dyspnea on exertion   • Smoker   • Essential hypertension   • Mixed hyperlipidemia   • Type 2 diabetes mellitus without complication, without long-term current use of insulin (CMS/[facility])   • Overweight (BMI 25.0-29.9)   • Abnormal PFTs   • Cigarette nicotine dependence, uncomplicated   • Stage 2 moderate COPD by GOLD classification (CMS/[facility])         Current Outpatient Medications:   •  ASPIRIN 81 PO, Daily., Disp: , Rfl:   •  atorvastatin (LIPITOR) 20 MG tablet, Daily., Disp: , Rfl:   •  Liraglutide (VICTOZA) 18 MG/3ML solution pen-injector injection, Victoza 2-Justin 0.6 mg/0.1 mL (18 mg/3 mL) subcutaneous pen injector  Inject 0.6 mg daily for 1 week subcutaneously, then increase to 1.2 mg injected subcutaneously daily, Disp: , Rfl:   •  lisinopril (PRINIVIL,ZESTRIL) 5 MG tablet, lisinopril 5 mg tablet  Take 1 tablet(s) every day by oral route in the morning., Disp: , Rfl:   •  metFORMIN (GLUCOPHAGE) 500 MG tablet, 500 mg 3 (Three) Times a Day., Disp: , Rfl:   •  albuterol sulfate  (90 Base) MCG/ACT inhaler, Inhale 2 puffs Every 4 (Four) Hours As Needed for Wheezing or Shortness of Air., Disp: 18 g, Rfl: 11    No Known Allergies    Past Medical History:   Diagnosis Date   • Diabetes (CMS/[facility])    • Hyperlipidemia    • Hypertension      History reviewed. No pertinent surgical history.  Social History     Socioeconomic History   • Marital status:      Spouse name: Not on file   • Number of children: Not on file   • Years of education: Not on file   • Highest education level: Not on file   Tobacco Use   • Smoking status: Former Smoker     Packs/day: 1.00     Years: 20.00     Pack years: 20.00     Types: Cigarettes   • Smokeless tobacco: Never Used   • Tobacco comment: not smoked for 2 weeks   Substance and Sexual Activity   • Alcohol use: Yes     Alcohol/week: 3.6 oz     Types: 6 Cans of beer per week     Comment: weekends   • Drug use: Yes     Types: Marijuana      "Comment: occasional   • Sexual activity: Defer     Family History   Problem Relation Age of Onset   • Diabetes Mother    • Diabetes Sister           Objective     Blood pressure 123/72, pulse 78, height 180.3 cm (71\"), weight 83 kg (183 lb), SpO2 98 %.  Physical Exam   Constitutional: He is oriented to person, place, and time. Vital signs are normal. He appears well-developed and well-nourished.   HENT:   Head: Normocephalic and atraumatic.   Nose: Mucosal edema present.   Mouth/Throat: Uvula is midline, oropharynx is clear and moist and mucous membranes are normal. He has dentures.   Mallampati 3, retrognathia   Eyes: Conjunctivae, EOM and lids are normal. Pupils are equal, round, and reactive to light.   Neck: Trachea normal and normal range of motion. No tracheal tenderness present. No thyroid mass present.   Cardiovascular: Normal rate, regular rhythm and normal heart sounds. PMI is not displaced. Exam reveals no gallop.   No murmur heard.  Pulmonary/Chest: Effort normal and breath sounds normal. No respiratory distress. He has no decreased breath sounds. He has no wheezes. He has no rhonchi. Chest wall is not dull to percussion. He exhibits no tenderness.   Abdominal: Soft. Normal appearance and bowel sounds are normal. There is no hepatomegaly. There is no tenderness.   Musculoskeletal:   Normal gait, no extremity edema     Vascular Status -  His right foot exhibits abnormal foot edema. His left foot exhibits abnormal foot edema.  Lymphadenopathy:        Head (right side): No submandibular adenopathy present.        Head (left side): No submandibular adenopathy present.     He has no cervical adenopathy.        Right: No supraclavicular adenopathy present.        Left: No supraclavicular adenopathy present.   Neurological: He is alert and oriented to person, place, and time.   Skin: Skin is warm and dry. No rash noted. No cyanosis. Nails show no clubbing.   Psychiatric: He has a normal mood and affect. His " speech is normal and behavior is normal. Judgment normal.   Nursing note and vitals reviewed.      PFTs: 9/17/2019 (independently reviewed and interpreted by me)  Ratio 59  FVC 3.26/66%  FEV1 1.86/50%  TLC 4.17/57%  DLCO 15.8/47%  Moderately severe obstruction with no significant bronchodilator response.  Mild restriction.  Moderately reduced diffusing capacity.  No comparative data available.    Radiology (independently reviewed and interpreted by me): CXR 11/14/2019 showed elevation of right hemidiaphragm, right perihilar granuloma, increased interstitial markings likely chronic, NACPD       Assessment/Plan     David was seen today for shortness of breath.    Diagnoses and all orders for this visit:    Abnormal PFTs    Dyspnea on exertion    Stage 2 moderate COPD by GOLD classification (CMS/[facility])  -     albuterol sulfate  (90 Base) MCG/ACT inhaler; Inhale 2 puffs Every 4 (Four) Hours As Needed for Wheezing or Shortness of Air.    Cigarette nicotine dependence, uncomplicated    Seasonal allergic rhinitis due to pollen         Discussion/ Recommendations:   PFTs showed moderate to severe obstruction with a component of restriction (disagree with interpretation by Dr. Hernandez).  Chest x-ray did not show any significant pathology.  He did have a lung cancer screening CT at [facility], but unfortunately do not have report or images to review.  While he does have significant obstruction on point function testing, he is relatively asymptomatic.  I did offer him initiation of a long-acting bronchodilator, but the patient feels like it is unnecessary at this time.  I recommended that he use albuterol as needed for dyspnea or wheeze to assess response as well as frequency of use.  I think his current throat clearing is likely related to some seasonal allergies and I suggested that he try over-the-counter nasal steroids.  I have encouraged him to maintain smoking cessation lifelong and have made recommendations to  help him with the cravings.    -Start albuterol as needed for dyspnea, wheeze, or chest tightness.  Instructed on proper use.  -If albuterol use exceeds 3 times per week, I would recommend initiating a long-acting bronchodilator.  -Allergen avoidance and recommended he try an over-the-counter nasal steroid for his acute allergic rhinitis.  -Encouraged continued smoking cessation.  Made recommendations for ways to help with the habit.  -Annual LD CT; >1ppd x 35yrs, quit 11/2019.  Obtain report from [facility].  -Encouraged him to get Pneumovax 23 as well as annual flu vaccine as soon as possible.    Patient's Body mass index is 25.52 kg/m². BMI is above normal parameters. Recommendations include: exercise counseling.    Addendum: Received report from low-dose CT done at [facility] on 4/17/2019.  Report describes moderate chronic fibrotic change in both lungs most pronounced at the periphery.  No nodules or consolidation.  Coronary artery calcifications.  Recommend annual lung cancer screening CT while patient remains high risk.  Will need to requests images on disc for my review.         Return in about 4 weeks (around 12/12/2019) for Recheck COPD.      Thank you for allowing me to participate in the care of David Najera. Please do not hesitate to contact me with any questions.         This document has been electronically signed by Laurie Reyna MD on November 14, 2019 1:26 PM      Dictated using Dragon

## 2019-11-14 ENCOUNTER — HOSPITAL ENCOUNTER (OUTPATIENT)
Dept: GENERAL RADIOLOGY | Facility: HOSPITAL | Age: 59
Discharge: HOME | End: 2019-11-14
Admitting: INTERNAL MEDICINE

## 2019-11-14 ENCOUNTER — OFFICE VISIT (OUTPATIENT)
Dept: PULMONOLOGY | Facility: CLINIC | Age: 59
End: 2019-11-14

## 2019-11-14 VITALS
HEIGHT: 71 IN | OXYGEN SATURATION: 98 % | DIASTOLIC BLOOD PRESSURE: 72 MMHG | SYSTOLIC BLOOD PRESSURE: 123 MMHG | BODY MASS INDEX: 25.62 KG/M2 | HEART RATE: 78 BPM | WEIGHT: 183 LBS

## 2019-11-14 DIAGNOSIS — J44.9 STAGE 2 MODERATE COPD BY GOLD CLASSIFICATION (HCC): ICD-10-CM

## 2019-11-14 DIAGNOSIS — R06.09 DYSPNEA ON EXERTION: ICD-10-CM

## 2019-11-14 DIAGNOSIS — R94.2 ABNORMAL PFTS: Primary | ICD-10-CM

## 2019-11-14 DIAGNOSIS — J30.1 SEASONAL ALLERGIC RHINITIS DUE TO POLLEN: ICD-10-CM

## 2019-11-14 DIAGNOSIS — F17.210 CIGARETTE NICOTINE DEPENDENCE, UNCOMPLICATED: ICD-10-CM

## 2019-11-14 PROCEDURE — 71046 X-RAY EXAM CHEST 2 VIEWS: CPT

## 2019-11-14 PROCEDURE — 99204 OFFICE O/P NEW MOD 45 MIN: CPT | Performed by: INTERNAL MEDICINE

## 2019-11-14 RX ORDER — ALBUTEROL SULFATE 90 UG/1
2 AEROSOL, METERED RESPIRATORY (INHALATION) EVERY 4 HOURS PRN
Qty: 18 G | Refills: 11 | Status: SHIPPED | OUTPATIENT
Start: 2019-11-14

## 2019-12-13 ENCOUNTER — TELEPHONE (OUTPATIENT)
Dept: PULMONOLOGY | Facility: CLINIC | Age: 59
End: 2019-12-13

## 2019-12-17 NOTE — PROGRESS NOTES
Pulmonary Office Follow-up    Subjective     David Najera is seen today at the office for   Chief Complaint   Patient presents with   • Shortness of Breath       Subjective     History of Present Illness  David Najera is a 59 y.o. male with a PMH significant for COPD, tobacco use, hypertension, hyperlipidemia, and diabetes mellitus who presents for follow-up of COPD.    11/14/19: I recommended starting albuterol as needed then reassessing for frequency of use to determine whether long-acting bronchodilator was necessary.  After the patient had left we did receive the report from his lung cancer screening CT which occurred in April 2019 and described chronic fibrotic changes but no nodules.    12/12/19: He states he has not had any problems with his breathing and actually has not needed his albuterol since his last visit.  Patient denies any dyspnea, wheeze, or chest pain.  He does admit to some cough but reports he has an upper respiratory infection currently with nasal congestion and drainage.  He denies any fevers.  Patient has been able to maintain smoking cessation but he does admit to some cravings.  He has not gotten the flu vaccine yet.    Tobacco use history:  Type: cigarettes  Amount: >1 ppd  Duration: 35 years  Cessation: 11/2019   Willing to quit: N/A      Review of Systems: History obtained from chart review and the patient.  Review of Systems   Constitutional: Negative for fever and unexpected weight change.   HENT: Positive for congestion, postnasal drip, sinus pressure and voice change.    Respiratory: Positive for cough. Negative for shortness of breath and wheezing.    Cardiovascular: Negative for chest pain and leg swelling.   Gastrointestinal: Negative for abdominal pain.     As described in the HPI. Otherwise, remainder of ROS (14 systems) were negative.    Patient Active Problem List   Diagnosis   • First degree AV block   • Dyspnea on exertion   • Smoker   • Essential  hypertension   • Mixed hyperlipidemia   • Type 2 diabetes mellitus without complication, without long-term current use of insulin (CMS/[facility])   • Overweight (BMI 25.0-29.9)   • Abnormal PFTs   • Stage 2 moderate COPD by GOLD classification (CMS/[facility])   • Personal history of tobacco use, presenting hazards to health         Current Outpatient Medications:   •  albuterol sulfate  (90 Base) MCG/ACT inhaler, Inhale 2 puffs Every 4 (Four) Hours As Needed for Wheezing or Shortness of Air., Disp: 18 g, Rfl: 11  •  ASPIRIN 81 PO, Daily., Disp: , Rfl:   •  atorvastatin (LIPITOR) 20 MG tablet, Daily., Disp: , Rfl:   •  Liraglutide (VICTOZA) 18 MG/3ML solution pen-injector injection, Victoza 2-Justin 0.6 mg/0.1 mL (18 mg/3 mL) subcutaneous pen injector  Inject 0.6 mg daily for 1 week subcutaneously, then increase to 1.2 mg injected subcutaneously daily, Disp: , Rfl:   •  lisinopril (PRINIVIL,ZESTRIL) 5 MG tablet, lisinopril 5 mg tablet  Take 1 tablet(s) every day by oral route in the morning., Disp: , Rfl:   •  metFORMIN (GLUCOPHAGE) 500 MG tablet, 500 mg 3 (Three) Times a Day., Disp: , Rfl:     No Known Allergies    Past Medical History:   Diagnosis Date   • Diabetes (CMS/[facility])    • Hyperlipidemia    • Hypertension      History reviewed. No pertinent surgical history.  Social History     Socioeconomic History   • Marital status:      Spouse name: Not on file   • Number of children: Not on file   • Years of education: Not on file   • Highest education level: Not on file   Tobacco Use   • Smoking status: Former Smoker     Packs/day: 1.00     Years: 20.00     Pack years: 20.00     Types: Cigarettes   • Smokeless tobacco: Never Used   • Tobacco comment: not smoked for 2 weeks   Substance and Sexual Activity   • Alcohol use: Yes     Alcohol/week: 6.0 standard drinks     Types: 6 Cans of beer per week     Comment: weekends   • Drug use: Yes     Types: Marijuana     Comment: occasional   • Sexual activity: Defer     Family  "History   Problem Relation Age of Onset   • Diabetes Mother    • Diabetes Sister           Objective     Blood pressure 132/80, pulse 86, height 180.3 cm (71\"), weight 84.8 kg (187 lb), SpO2 97 %.  Physical Exam   Constitutional: He is oriented to person, place, and time. Vital signs are normal. He appears well-developed and well-nourished.   HENT:   Head: Normocephalic and atraumatic.   Nose: Rhinorrhea present. No mucosal edema.   Mouth/Throat: Uvula is midline, oropharynx is clear and moist and mucous membranes are normal. He has dentures.   Mallampati 3, retrognathia   Eyes: Pupils are equal, round, and reactive to light. Conjunctivae, EOM and lids are normal.   Neck: Trachea normal and normal range of motion. No tracheal tenderness present. No thyroid mass present.   Cardiovascular: Normal rate, regular rhythm and normal heart sounds. PMI is not displaced. Exam reveals no gallop.   No murmur heard.  Pulmonary/Chest: Effort normal and breath sounds normal. No respiratory distress. He has no decreased breath sounds. He has no wheezes. He has no rhonchi. Chest wall is not dull to percussion. He exhibits no tenderness.   Abdominal: Soft. Normal appearance and bowel sounds are normal. There is no hepatomegaly. There is no tenderness.   Musculoskeletal:   Normal gait, no extremity edema     Vascular Status -  His right foot exhibits no edema. His left foot exhibits no edema.  Lymphadenopathy:        Head (right side): No submandibular adenopathy present.        Head (left side): No submandibular adenopathy present.     He has no cervical adenopathy.        Right: No supraclavicular adenopathy present.        Left: No supraclavicular adenopathy present.   Neurological: He is alert and oriented to person, place, and time.   Skin: Skin is warm and dry. No rash noted. No cyanosis. Nails show no clubbing.   Psychiatric: He has a normal mood and affect. His speech is normal and behavior is normal. Judgment normal. "   Nursing note and vitals reviewed.      PFTs: 9/17/2019 (independently reviewed and interpreted by me)  Ratio 59  FVC 3.26/66%  FEV1 1.86/50%  TLC 4.17/57%  DLCO 15.8/47%  Moderately severe obstruction with no significant bronchodilator response.  Mild restriction.  Moderately reduced diffusing capacity.  No comparative data available.       Assessment/Plan     David was seen today for shortness of breath.    Diagnoses and all orders for this visit:    Stage 2 moderate COPD by GOLD classification (CMS/[facility])    Personal history of tobacco use, presenting hazards to health  -     CT Chest Low Dose Wo; Future    Acute nasopharyngitis         Discussion/ Recommendations:   He has no issues with dyspnea at this point so I do not feel that further changes are necessary.  I did encourage him to keep his albuterol handy in case he gets out of breath and if he starts experiencing more dyspnea we can discuss initiation of a long-acting bronchodilator.  Otherwise, I have encouraged him to maintain smoking cessation lifelong and suggested that he try 1 of the nicotine replacement products to help with some of his cravings.  He does currently have a viral URI and I have recommended symptomatic therapy.    -Symptomatic therapy for current probable viral URI.  -Use albuterol as needed for dyspnea, wheeze, or chest tightness.  -If albuterol use exceeds 3 times per week, I would recommend initiating a long-acting bronchodilator.  -Encouraged continued smoking cessation.  Recommended that he consider 1 of the nicotine replacement products help with cravings.  -Lung cancer screening shared decision making counseling: The patient meets criteria for lung cancer screening CT (LDCT); >1ppd x 35yrs, quit 11/2019 with no symptoms of lung cancer.  Reviewed benefits of such screening including, early detection of potentially curable lung cancer.  Also, discussed risks of screening including, radiation exposure, test anxiety, false  positives, risk associated with future procedures, and possibility of clinically significant incidental findings.  The patient does agree to undergo lung cancer screening. Due on or after 4/17/20  -Encouraged him to get Pneumovax 23 as well as annual flu vaccine soon.    Patient's Body mass index is 26.08 kg/m². BMI is within normal parameters. No follow-up required..       Return in about 4 months (around 4/18/2020) for Recheck COPD, F/u LDCT.      Thank you for allowing me to participate in the care of David Najera. Please do not hesitate to contact me with any questions.         This document has been electronically signed by Laurie Reyna MD on December 18, 2019 1:22 PM      Dictated using Dragon

## 2019-12-18 ENCOUNTER — OFFICE VISIT (OUTPATIENT)
Dept: PULMONOLOGY | Facility: CLINIC | Age: 59
End: 2019-12-18

## 2019-12-18 VITALS
HEART RATE: 86 BPM | OXYGEN SATURATION: 97 % | WEIGHT: 187 LBS | HEIGHT: 71 IN | SYSTOLIC BLOOD PRESSURE: 132 MMHG | DIASTOLIC BLOOD PRESSURE: 80 MMHG | BODY MASS INDEX: 26.18 KG/M2

## 2019-12-18 DIAGNOSIS — J00 ACUTE NASOPHARYNGITIS: ICD-10-CM

## 2019-12-18 DIAGNOSIS — Z87.891 PERSONAL HISTORY OF TOBACCO USE, PRESENTING HAZARDS TO HEALTH: ICD-10-CM

## 2019-12-18 DIAGNOSIS — J44.9 STAGE 2 MODERATE COPD BY GOLD CLASSIFICATION (HCC): Primary | ICD-10-CM

## 2019-12-18 PROBLEM — F17.210 CIGARETTE NICOTINE DEPENDENCE, UNCOMPLICATED: Status: RESOLVED | Noted: 2019-11-13 | Resolved: 2019-12-18

## 2019-12-18 PROCEDURE — G0296 VISIT TO DETERM LDCT ELIG: HCPCS | Performed by: INTERNAL MEDICINE

## 2019-12-18 PROCEDURE — 99213 OFFICE O/P EST LOW 20 MIN: CPT | Performed by: INTERNAL MEDICINE

## 2020-04-29 ENCOUNTER — HOSPITAL ENCOUNTER (OUTPATIENT)
Dept: CT IMAGING | Facility: HOSPITAL | Age: 60
Discharge: HOME | End: 2020-04-29
Admitting: INTERNAL MEDICINE

## 2020-04-29 ENCOUNTER — APPOINTMENT (OUTPATIENT)
Dept: CT IMAGING | Facility: HOSPITAL | Age: 60
End: 2020-04-29

## 2020-04-29 DIAGNOSIS — Z87.891 PERSONAL HISTORY OF TOBACCO USE, PRESENTING HAZARDS TO HEALTH: ICD-10-CM

## 2020-04-29 PROCEDURE — G0297 LDCT FOR LUNG CA SCREEN: HCPCS

## 2022-10-28 ENCOUNTER — NURSE NAVIGATOR (OUTPATIENT)
Dept: ONCOLOGY | Facility: CLINIC | Age: 62
End: 2022-10-28

## 2022-10-28 NOTE — PROGRESS NOTES
LUNG PATIENT NAVIGATION    Lung Screening Program    Letter mailed to pt regarding LDCT scan due. Contact number provided for assistance or questions as needed.